# Patient Record
(demographics unavailable — no encounter records)

---

## 2025-03-27 NOTE — PHYSICAL EXAM
[Normal Conjunctiva] : normal conjunctiva [Normal Venous Pressure] : normal venous pressure [No Carotid Bruit] : no carotid bruit [No Rub] : no rub [No Gallop] : no gallop [Clear Lung Fields] : clear lung fields [Good Air Entry] : good air entry [No Masses/organomegaly] : no masses/organomegaly [No Cyanosis] : no cyanosis [No Clubbing] : no clubbing [No Rash] : no rash [No Skin Lesions] : no skin lesions [Moves all extremities] : moves all extremities [Normal Speech] : normal speech [Alert and Oriented] : alert and oriented [Normal memory] : normal memory [No Acute Distress] : no acute distress [Well Nourished] : well nourished [Well Developed] : well developed [Well-Appearing] : well-appearing [Normal Sclera/Conjunctiva] : normal sclera/conjunctiva [PERRL] : pupils equal round and reactive to light [EOMI] : extraocular movements intact [Normal Outer Ear/Nose] : the outer ears and nose were normal in appearance [Normal Oropharynx] : the oropharynx was normal [Normal TMs] : both tympanic membranes were normal [No JVD] : no jugular venous distention [No Lymphadenopathy] : no lymphadenopathy [Supple] : supple [Thyroid Normal, No Nodules] : the thyroid was normal and there were no nodules present [No Respiratory Distress] : no respiratory distress  [No Accessory Muscle Use] : no accessory muscle use [Clear to Auscultation] : lungs were clear to auscultation bilaterally [Normal Rate] : normal rate  [Regular Rhythm] : with a regular rhythm [Normal S1, S2] : normal S1 and S2 [No Murmur] : no murmur heard [No Carotid Bruits] : no carotid bruits [No Abdominal Bruit] : a ~M bruit was not heard ~T in the abdomen [No Varicosities] : no varicosities [Pedal Pulses Present] : the pedal pulses are present [No Edema] : there was no peripheral edema [No Palpable Aorta] : no palpable aorta [No Extremity Clubbing/Cyanosis] : no extremity clubbing/cyanosis [Soft] : abdomen soft [Non Tender] : non-tender [Non-distended] : non-distended [No Masses] : no abdominal mass palpated [No HSM] : no HSM [Normal Bowel Sounds] : normal bowel sounds [Normal Posterior Cervical Nodes] : no posterior cervical lymphadenopathy [Normal Anterior Cervical Nodes] : no anterior cervical lymphadenopathy [No CVA Tenderness] : no CVA  tenderness [No Spinal Tenderness] : no spinal tenderness [No Joint Swelling] : no joint swelling [Grossly Normal Strength/Tone] : grossly normal strength/tone [Coordination Grossly Intact] : coordination grossly intact [No Focal Deficits] : no focal deficits [Normal Gait] : normal gait [Deep Tendon Reflexes (DTR)] : deep tendon reflexes were 2+ and symmetric [Normal Affect] : the affect was normal [Normal Insight/Judgement] : insight and judgment were intact [de-identified] : + circular lesion on RLE

## 2025-03-27 NOTE — DISCUSSION/SUMMARY
[FreeTextEntry1] : The patient is a 65-year-old female history of elevated BP, vasovagal syncope, palpitations who is doing well.  #1 Palpitations- c/w metoprolol 25mg bid, rarely needs another half #2 Vasovagal syncope- c/w metoprolol 25mg bid and hydration #3 General- We discussed adherence to a Mediterranean diet, weight loss and exercise routine. s/p COVID and COVID vaccine. 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   [EKG obtained to assist in diagnosis and management of assessed problem(s)] : EKG obtained to assist in diagnosis and management of assessed problem(s)

## 2025-03-27 NOTE — HISTORY OF PRESENT ILLNESS
[FreeTextEntry1] : 65-year-old female history of elevated BP, vasovagal syncope, palpitations who is doing soso. She feels people are more emotional. To relax she listens to classical music, walking which really helps her. She recently lost a cousin.

## 2025-03-27 NOTE — ADDENDUM
[FreeTextEntry1] : Documented by Kathy Marcano acting as a scribe for Dr. Nunez. 03/27/2025   All medical record entries made by the scribe were at my, Dr. Nunez, direction and personally dictated by me on 03/27/2025. I have reviewed the chart an agree that the record accurately reflects my personal performance of the history, physical exam, assessment and plan. I have also personally directed, reviewed, and agreed with the chart.

## 2025-03-27 NOTE — PHYSICAL EXAM
[Normal Conjunctiva] : normal conjunctiva [Normal Venous Pressure] : normal venous pressure [No Carotid Bruit] : no carotid bruit [No Rub] : no rub [No Gallop] : no gallop [Clear Lung Fields] : clear lung fields [Good Air Entry] : good air entry [No Masses/organomegaly] : no masses/organomegaly [No Cyanosis] : no cyanosis [No Clubbing] : no clubbing [No Rash] : no rash [No Skin Lesions] : no skin lesions [Moves all extremities] : moves all extremities [Normal Speech] : normal speech [Alert and Oriented] : alert and oriented [Normal memory] : normal memory [No Acute Distress] : no acute distress [Well Nourished] : well nourished [Well Developed] : well developed [Well-Appearing] : well-appearing [Normal Sclera/Conjunctiva] : normal sclera/conjunctiva [PERRL] : pupils equal round and reactive to light [EOMI] : extraocular movements intact [Normal Outer Ear/Nose] : the outer ears and nose were normal in appearance [Normal Oropharynx] : the oropharynx was normal [Normal TMs] : both tympanic membranes were normal [No JVD] : no jugular venous distention [No Lymphadenopathy] : no lymphadenopathy [Supple] : supple [Thyroid Normal, No Nodules] : the thyroid was normal and there were no nodules present [No Respiratory Distress] : no respiratory distress  [No Accessory Muscle Use] : no accessory muscle use [Clear to Auscultation] : lungs were clear to auscultation bilaterally [Normal Rate] : normal rate  [Regular Rhythm] : with a regular rhythm [Normal S1, S2] : normal S1 and S2 [No Murmur] : no murmur heard [No Carotid Bruits] : no carotid bruits [No Abdominal Bruit] : a ~M bruit was not heard ~T in the abdomen [No Varicosities] : no varicosities [Pedal Pulses Present] : the pedal pulses are present [No Edema] : there was no peripheral edema [No Palpable Aorta] : no palpable aorta [No Extremity Clubbing/Cyanosis] : no extremity clubbing/cyanosis [Soft] : abdomen soft [Non Tender] : non-tender [Non-distended] : non-distended [No Masses] : no abdominal mass palpated [No HSM] : no HSM [Normal Bowel Sounds] : normal bowel sounds [Normal Posterior Cervical Nodes] : no posterior cervical lymphadenopathy [Normal Anterior Cervical Nodes] : no anterior cervical lymphadenopathy [No CVA Tenderness] : no CVA  tenderness [No Spinal Tenderness] : no spinal tenderness [No Joint Swelling] : no joint swelling [Grossly Normal Strength/Tone] : grossly normal strength/tone [Coordination Grossly Intact] : coordination grossly intact [No Focal Deficits] : no focal deficits [Normal Gait] : normal gait [Deep Tendon Reflexes (DTR)] : deep tendon reflexes were 2+ and symmetric [Normal Affect] : the affect was normal [Normal Insight/Judgement] : insight and judgment were intact [de-identified] : + circular lesion on RLE

## 2025-03-27 NOTE — HEALTH RISK ASSESSMENT
[Very Good] : ~his/her~  mood as very good [No] : No [Little interest or pleasure doing things] : 1) Little interest or pleasure doing things [Feeling down, depressed, or hopeless] : 2) Feeling down, depressed, or hopeless [0] : 2) Feeling down, depressed, or hopeless: Not at all (0) [PHQ-2 Negative - No further assessment needed] : PHQ-2 Negative - No further assessment needed [Never] : Never [NO] : No [Alone] : lives alone [Employed] : employed [Graduate School] : graduate school [Single] : single [Fully functional (bathing, dressing, toileting, transferring, walking, feeding)] : Fully functional (bathing, dressing, toileting, transferring, walking, feeding) [Fully functional (using the telephone, shopping, preparing meals, housekeeping, doing laundry, using] : Fully functional and needs no help or supervision to perform IADLs (using the telephone, shopping, preparing meals, housekeeping, doing laundry, using transportation, managing medications and managing finances) [Smoke Detector] : smoke detector [Carbon Monoxide Detector] : carbon monoxide detector [Seat Belt] :  uses seat belt [Sunscreen] : uses sunscreen [With Patient/Caregiver] : , with patient/caregiver [Designated Healthcare Proxy] : Designated healthcare proxy [Name: ___] : Health Care Proxy's Name: [unfilled]  [Relationship: ___] : Relationship: [unfilled] [Comfort care only] : comfort care only [[id number]] : 0 [Change in mental status noted] : No change in mental status noted [Reports changes in vision] : Reports no changes in vision [Reports changes in dental health] : Reports no changes in dental health [Travel to Developing Areas] : does not  travel to developing areas [TB Exposure] : is not being exposed to tuberculosis [Caregiver Concerns] : does not have caregiver concerns [MammogramDate] : 2025 [PapSmearDate] : 2024 [BoneDensityDate] : 2023 [ColonoscopyDate] : never [ColonoscopyComments] : cologard 2024  [de-identified] : last eye exam 2025 [de-identified] : last dental 2024 [AdvancecareDate] : 3/27/25

## 2025-03-27 NOTE — HEALTH RISK ASSESSMENT
[Very Good] : ~his/her~  mood as very good [No] : No [Little interest or pleasure doing things] : 1) Little interest or pleasure doing things [Feeling down, depressed, or hopeless] : 2) Feeling down, depressed, or hopeless [0] : 2) Feeling down, depressed, or hopeless: Not at all (0) [PHQ-2 Negative - No further assessment needed] : PHQ-2 Negative - No further assessment needed [Never] : Never [NO] : No [Alone] : lives alone [Employed] : employed [Graduate School] : graduate school [Single] : single [Fully functional (bathing, dressing, toileting, transferring, walking, feeding)] : Fully functional (bathing, dressing, toileting, transferring, walking, feeding) [Fully functional (using the telephone, shopping, preparing meals, housekeeping, doing laundry, using] : Fully functional and needs no help or supervision to perform IADLs (using the telephone, shopping, preparing meals, housekeeping, doing laundry, using transportation, managing medications and managing finances) [Smoke Detector] : smoke detector [Carbon Monoxide Detector] : carbon monoxide detector [Seat Belt] :  uses seat belt [Sunscreen] : uses sunscreen [With Patient/Caregiver] : , with patient/caregiver [Designated Healthcare Proxy] : Designated healthcare proxy [Name: ___] : Health Care Proxy's Name: [unfilled]  [Relationship: ___] : Relationship: [unfilled] [Comfort care only] : comfort care only [[id number]] : 0 [Change in mental status noted] : No change in mental status noted [Reports changes in vision] : Reports no changes in vision [Reports changes in dental health] : Reports no changes in dental health [Travel to Developing Areas] : does not  travel to developing areas [TB Exposure] : is not being exposed to tuberculosis [Caregiver Concerns] : does not have caregiver concerns [MammogramDate] : 2025 [PapSmearDate] : 2024 [BoneDensityDate] : 2023 [ColonoscopyDate] : never [ColonoscopyComments] : cologard 2024  [de-identified] : last eye exam 2025 [de-identified] : last dental 2024 [AdvancecareDate] : 3/27/25

## 2025-03-27 NOTE — HISTORY OF PRESENT ILLNESS
[de-identified] : Ms. CURRY WALKER is a 65-year-old female with Hx of asthma, GERD, vasovagal syncope, and C. diff colitis, presenting for an annual physical.   Pt states she is feeling well, exercises regularly, and is following a healthy diet. She is UTD with mammogram /PAP and did Cologuard last year. She followed with ophthalmology recently and c/o dry eye.  Pt reports reaction to second shingles shot and states she had a fever.   Pt c/o intermittent swelling and discomfort of her throat. Denies any CP, SOB, or abdominal pain.

## 2025-03-27 NOTE — PLAN
[FreeTextEntry1] : Physical   Throat discomfort  Referred to GI   Fungal Dermatitis  start clotrimazole-betamethasone cream Pt to follow up in one week or sooner if Sx persist or worsen.  Health Maintenance  Referred for bone density scan  Referred to GI for colonoscopy  UTD with Shingles vaccine/ mammogram /PAP   Dry eye  Referred to Ophthalmology   Vasovagal syncope/ Palpitation cont. metoprolol 25 mg QD follows with cardiology   Bloodwork ordered.  Follow up in one week for lab results.

## 2025-03-27 NOTE — HISTORY OF PRESENT ILLNESS
[de-identified] : Ms. CURRY WALKER is a 65-year-old female with Hx of asthma, GERD, vasovagal syncope, and C. diff colitis, presenting for an annual physical.   Pt states she is feeling well, exercises regularly, and is following a healthy diet. She is UTD with mammogram /PAP and did Cologuard last year. She followed with ophthalmology recently and c/o dry eye.  Pt reports reaction to second shingles shot and states she had a fever.   Pt c/o intermittent swelling and discomfort of her throat. Denies any CP, SOB, or abdominal pain.

## 2025-04-15 NOTE — PHYSICAL EXAM
[Alert] : alert [Oriented x 3] : ~L oriented x 3 [Well Nourished] : well nourished [Conjunctiva Non-injected] : conjunctiva non-injected [No Visual Lymphadenopathy] : no visual  lymphadenopathy [No Clubbing] : no clubbing [No Edema] : no edema [No Bromhidrosis] : no bromhidrosis [No Chromhidrosis] : no chromhidrosis [Full Body Skin Exam Performed] : performed [FreeTextEntry3] : pink telangiectatic macule with surrounding erythema, blanching  uniform brown macules and papules over trunk and extremities stuck on brown papules over trunk and extremities

## 2025-04-15 NOTE — ASSESSMENT
[FreeTextEntry1] : #Favor hemangioma, right shin, new diagnosis with uncertain prognosis - I have discussed the benign nature and usual course with the patient, reassured. - continue to monitor for changes   #SKs #Multiple melanocytic nevi, benign - education, counseling, reassurance - ABCDEs of melanoma reviewed, rtc sooner if changing   FBSE/Healthcare maintenance -Sun protection was discussed. The proper use of broad-spectrum UVA/UVB sunscreens with SPF 30 or greater was reviewed and the need for re-application after swimming or sweating or 2-3 hours was emphasized. We talked about judicious use of clothing and avoidance of peak periods of sun exposure. I made the patient aware of the need for year-round protection. ABCDEs of melanoma were also reviewed. -Self-skin checks were also reviewed, rtc sooner if changed noted -Counseled patient to monitor for changes - FBSE completed today, f/u 1 year

## 2025-04-15 NOTE — HISTORY OF PRESENT ILLNESS
[FreeTextEntry1] : NV spots  [de-identified] : CURRY WALKER is a 66 year old female presenting for:  1. Red spot, slightly itchy on her right shin for 1.5 months. Improved with 2 week course of clotrimazole/betamethasone cream. 2. Spots scattered on body x years. Asymptomatic and unchanged. No alleviating/aggravating factors. Never been treated.  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

## 2025-05-19 NOTE — HISTORY OF PRESENT ILLNESS
[FreeTextEntry1] : 67 yo female, didn't schedule her colonoscopy due to coming down with covid8/22 pos stool test with c.diff.  Pt traveled to [place] July 10-28. Had abx (keflex) for klebsiella in urine. Pt received a course of Levaquin by Dr. Howard in July, prior to her travels. Pt also had course of augment in April.Pt states Dentist wants to place her on Amox for infection around her crown.  Returns in followup; has regular bms, green in color. Finished Dificid for culture pos c.diff.  Last week had 2 days of loose bms. Had regular diet.  RTO 5/1925 reviewed cbc from april hb 14, cmp normal.   Has occ softr bms, useing florastor one a day. Not using imodium more than 2 x a month. Weight stable at 188. NO rectal bleeding. HDL 57 TOtal chol 202, . FIT test normal 2024 and 2023. Having regular bms.

## 2025-05-19 NOTE — ASSESSMENT
[FreeTextEntry1] : 67 yo female, didn't schedule her colonoscopy due to coming down with covid8/22 pos stool test with c.diff.  Pt traveled to [place] July 10-28. Had abx (keflex) for klebsiella in urine. Pt received a course of Levaquin by Dr. Howard in July, prior to her travels. Pt also had course of augment in April.Pt states Dentist wants to place her on Amox for infection around her crown.  Returns in followup; has regular bms, green in color. Finished Dificid for culture pos c.diff.  Last week had 2 days of loose bms. Had regular diet.  RTO 5/1925 reviewed cbc from april hb 14, cmp normal.   Has occ softr bms, useing florastor one a day. Not using imodium more than 2 x a month. Weight stable at 188. NO rectal bleeding. HDL 57 TOtal chol 202, . FIT test normal 2024 and 2023. Having regular bms.   Pt prefers to screen for colon cancer with annual FIT tests, two of which have been normal IMP:  1. IBS 2. HLD 3 . Stable weight  PLAN: 1. maintain florastor daily 2. FOllowup with Dr. Dooley for chol mgmt. 3 . Annual FIT test.

## 2025-05-19 NOTE — PHYSICAL EXAM

## 2025-05-19 NOTE — REVIEW OF SYSTEMS
[As Noted in HPI] : as noted in HPI [Negative] : Heme/Lymph [Abdominal Pain] : no abdominal pain [Diarrhea] : no diarrhea